# Patient Record
Sex: MALE | Race: BLACK OR AFRICAN AMERICAN | HISPANIC OR LATINO | Employment: OTHER | URBAN - METROPOLITAN AREA
[De-identification: names, ages, dates, MRNs, and addresses within clinical notes are randomized per-mention and may not be internally consistent; named-entity substitution may affect disease eponyms.]

---

## 2020-11-19 ENCOUNTER — HOSPITAL ENCOUNTER (OUTPATIENT)
Dept: URGENT CARE | Facility: CLINIC | Age: 40
Discharge: HOME | End: 2020-11-19
Attending: NURSE PRACTITIONER

## 2022-05-26 ENCOUNTER — OFFICE VISIT (OUTPATIENT)
Dept: SLEEP MEDICINE | Facility: HOSPITAL | Age: 42
End: 2022-05-26

## 2022-05-26 VITALS
WEIGHT: 195.3 LBS | HEIGHT: 74 IN | BODY MASS INDEX: 25.07 KG/M2 | HEART RATE: 82 BPM | SYSTOLIC BLOOD PRESSURE: 135 MMHG | DIASTOLIC BLOOD PRESSURE: 91 MMHG | OXYGEN SATURATION: 97 %

## 2022-05-26 DIAGNOSIS — R06.83 SNORING: ICD-10-CM

## 2022-05-26 PROCEDURE — G0463 HOSPITAL OUTPT CLINIC VISIT: HCPCS

## 2022-05-26 RX ORDER — CHOLECALCIFEROL (VITAMIN D3) 125 MCG
5 CAPSULE ORAL
COMMUNITY

## 2022-05-26 RX ORDER — IBUPROFEN 200 MG
200 TABLET ORAL EVERY 6 HOURS PRN
COMMUNITY

## 2022-05-26 NOTE — PROGRESS NOTES
"CONSULT NOTE    Patient Identification:  Claude Coffi Zohoun  41 y.o.  male  1980  [id number]            Requesting physician: Jose quesada    Reason for Consultation: Snoring hypersomnia/insomnia    CC:     History of Present Illness:  41-year-old gentleman is here for evaluation of snoring and witnessed apnea.  He also reports issues with sleep onset and maintenance.  He also reports witnessed apnea reported by his wife.  Wakes up feeling unrested sleepy and tired as the day progresses.  No heavy alcohol use was reported.  No parasomnia such as sleepwalking or sleep talking as such was reported.  Sleep schedule time to bed 10 gets up 5 gets about 6+ hours of sleep and still feels tired.  Also reports morning headache with a dry mouth.  No previous sleep study or evaluation done.      Review of Systems  Completely -12 point review of system  [place] 5 out of 24 within normal limits  Past Medical History:  No past medical history on file.  Low back pain on ibuprofen  Past Surgical History:  No past surgical history on file.     Home Meds:  (Not in a hospital admission)      Allergies:  Not on File    Social History:   Social History     Socioeconomic History   • Marital status:        Family History:  No family history on file.    Physical Exam:  /91 (BP Location: Left arm, Patient Position: Sitting)   Pulse 82   Ht 188 cm (74\")   Wt 88.6 kg (195 lb 4.8 oz)   SpO2 97%   BMI 25.08 kg/m²  Body mass index is 25.08 kg/m². 97% 88.6 kg (195 lb 4.8 oz)  Physical Exam  Patient is examined using the personal protective equipment as per guidelines from infection control for this particular patient as enacted.  Hand hygiene was performed before and after patient interaction.  Well-developed normal body habitus  Eyes normal conjunctivae and pupils reactive to light  ENT Mallampati between 3 and 4 normal nasal exam  Neck midline trachea no thyromegaly  Chest no labored breathing clear  CVS regular rate and " rhythm no lower extremity edema  Abdomen soft nontender no hepatosplenomegaly  CNS intact normal sensory exam  Skin no rashes no nodules  Psych oriented to time place and person normal memory  Musculoskeletal no cyanosis no clubbing normal range of motion        LABS:  No results found for: CALCIUM, PHOS    No results found for: CKTOTAL, CKMB, CKMBINDEX, TROPONINI, TROPONINT                              No results found for: TSH  CrCl cannot be calculated (No successful lab value found.).         Imaging: I personally visualized the images of scans/x-rays performed within last 3 days.      Assessment:  Hypersomnia  Insomnia  Snoring  Low back pain        Recommendations:  At this time we have a young gentleman with snoring witnessed apnea and some insomnia sleep onset and maintenance.  There may be element of sleep disordered breathing.  Discussed pathophysiology consequence of untreated sleep apnea.  Patient agreeable wishing to proceed for home sleep study.  Sleep hygiene measures discussed in detail.  I have asked him to use melatonin up to 10 mg if needed for sleep onset or sleep maintenance insomnia and see if that helps.  Back pain needs to be controlled  I will follow-up after sleep study to make further recommendations                Danya Roberson MD  5/26/2022  08:58 EDT      Much of this encounter note is an electronic transcription/translation of spoken language to printed text using Dragon Software.

## 2022-05-31 ENCOUNTER — OFFICE VISIT (OUTPATIENT)
Dept: UROLOGY | Facility: CLINIC | Age: 42
End: 2022-05-31

## 2022-05-31 VITALS — BODY MASS INDEX: 25.38 KG/M2 | WEIGHT: 197.8 LBS | HEIGHT: 74 IN

## 2022-05-31 DIAGNOSIS — N50.3 EPIDIDYMAL CYST: Primary | ICD-10-CM

## 2022-05-31 PROCEDURE — 99202 OFFICE O/P NEW SF 15 MIN: CPT | Performed by: UROLOGY

## 2022-05-31 RX ORDER — LOSARTAN POTASSIUM 25 MG/1
25 TABLET ORAL DAILY
COMMUNITY

## 2022-05-31 NOTE — PROGRESS NOTES
"    UROLOGY OFFICE H&P NOTE    Subjective   HPI  Claude Coffi Zohoun is a 41 y.o. male. The patient states that he has a concern about his scrotum that has been ongoing since 12/2021. He has a small lump inside his right testicle. He adds that he can feel the lump when his testicle stretches. The lump is not painful, and has not increased in size. He describes the lump as \"squishy.\" He has 2 children, and has not had a vasectomy.     He denies a family history of testicular cancer.   He does have a family history of prostate cancer in an uncle who passed away last week from prostate cancer in spite of having undergone surgery. His father is still living at 85 years old.    The patient is currently in the .  No prior imaging or urologic evaluation.      No results found for this or any previous visit.      Medical History:  Past Medical History:   Diagnosis Date   • Hypertension         Social History:  Social History     Socioeconomic History   • Marital status:    Tobacco Use   • Smoking status: Never Smoker   • Smokeless tobacco: Never Used   Vaping Use   • Vaping Use: Never used        Family History:  Family History   Problem Relation Age of Onset   • Prostate cancer Paternal Uncle         Surgical History:  History reviewed. No pertinent surgical history.     Allergies:  No Known Allergies     Current Medications:  Current Outpatient Medications   Medication Sig Dispense Refill   • ibuprofen (ADVIL,MOTRIN) 200 MG tablet Take 200 mg by mouth Every 6 (Six) Hours As Needed for Mild Pain .     • losartan (COZAAR) 25 MG tablet Take 25 mg by mouth Daily.     • melatonin 5 MG tablet tablet Take 5 mg by mouth.       No current facility-administered medications for this visit.       Review of systems  A review of systems was performed, and positive findings are noted in the HPI.    Objective     Vital Signs:   Ht 188 cm (74\")   Wt 89.7 kg (197 lb 12.8 oz)   BMI 25.40 kg/m²       Physical exam  No acute " distress, well-nourished  Awake alert and oriented  Mood normal; affect normal  : Circumcised male with no lesions; scrotum without edema or lesions; bilateral descended testis of normal size, shape, and consistency; no cord abnormality bilaterally; palpable cystic feature to right epididymis, all small, nontender; normal palpable left epididymis    Problem List:  There is no problem list on file for this patient.      Assessment & Plan   Diagnoses and all orders for this visit:    1. Epididymal cyst (Primary)  -     US Scrotum & Testicles; Future      Discussed general American urologic Association recommendations for routine prostate cancer screening given patient's family history of prostate cancer in uncle.  Discussed that routine prostate cancer screening via annual PSA is recommended to start around age 55.  No indication for prostate cancer screening at this time but would recommend annual PSA at age 55.    1. Lump in right testicle-provided reassurance per physical exam findings  The patient has a small, painless lump in his right testicle that has been present since 12/2021. This most likely represents a series of cysts of the epididymis. If that is what his lump is, we will not need to do anything further unless the lump increases in size or begins to cause him pain.     We will order a testicular ultrasound.     Patient encouraged to follow-up 1 month, with prior testicular ultrasound.  All questions addressed    Transcribed from ambient dictation for Shoshana Oconnor MD by Carol Whitaker.  05/31/22   15:36 EDT    Patient verbalized consent to the visit recording.

## 2022-06-01 ENCOUNTER — PATIENT ROUNDING (BHMG ONLY) (OUTPATIENT)
Dept: UROLOGY | Facility: CLINIC | Age: 42
End: 2022-06-01

## 2022-06-01 NOTE — PROGRESS NOTES
June 1, 2022    Hello, may I speak with Claude Coffi Zohoun?    My name is CONG Holden      I am  with [facility]  [address]  [place] [address] [phone].    Before we get started may I verify your date of birth? 1980    I am calling to officially welcome you to our practice and ask about your recent visit. Is this a good time to talk? yes    Tell me about your visit with us. What things went well?  Patient stated that his visit went good. Dr. Oconnor was very thorough and explained things very well to him. He appreciated that.        We're always looking for ways to make our patients' experiences even better. Do you have recommendations on ways we may improve?  no    Overall were you satisfied with your first visit to our practice? yes       I appreciate you taking the time to speak with me today. Is there anything else I can do for you? no      Thank you, and have a great day.

## 2022-06-08 ENCOUNTER — HOSPITAL ENCOUNTER (OUTPATIENT)
Dept: SLEEP MEDICINE | Facility: HOSPITAL | Age: 42
Discharge: HOME | End: 2022-06-08
Admitting: INTERNAL MEDICINE

## 2022-06-08 DIAGNOSIS — R06.83 SNORING: ICD-10-CM

## 2022-06-08 PROCEDURE — 95806 SLEEP STUDY UNATT&RESP EFFT: CPT

## 2022-07-07 ENCOUNTER — TELEPHONE (OUTPATIENT)
Dept: UROLOGY | Facility: CLINIC | Age: 42
End: 2022-07-07

## 2022-07-07 NOTE — TELEPHONE ENCOUNTER
PER KASIA @ [facility], STILL DOESN'T HAVE AUTH FOR THE ULTRASOUND. SHE SAID IT WILL BE 3-5 MORE BUSINESS DAYS. I TOLD HER THAT THEY COULD RESCHEDULE HIM OUT A FEW WEEKS FOR THIS. I WILL HAVE OUR SCHEDULING CALL HIM TO RESCHEDULE HIS IN OFFICE FOLLOW-UP ALSO.

## 2022-07-08 ENCOUNTER — APPOINTMENT (OUTPATIENT)
Dept: ULTRASOUND IMAGING | Facility: HOSPITAL | Age: 42
End: 2022-07-08

## 2022-07-13 ENCOUNTER — TELEPHONE (OUTPATIENT)
Dept: UROLOGY | Facility: CLINIC | Age: 42
End: 2022-07-13

## 2022-07-13 NOTE — TELEPHONE ENCOUNTER
SPOKE TO PATIENT AND LET HIM KNOW THAT, PER [facility], HIS INSURANCE APPROVED HIS ULTRASOUND. HE IS GOOD TO GO FOR HIS IMAGING APPT ON 7/22. PATIENT EXPRESSED UNDERSTANDING. ADVISED HIM THAT SCHEDULING WOULD BE CALLING HIM TO ARRANGE AN IN OFFICE FOLLOW UP. PATIENT IS AGREEABLE.

## 2022-07-13 NOTE — TELEPHONE ENCOUNTER
SPOKE TO PT AND HE STATED THAT HE'S HAVING ISSUES WITH THE INSURANCE PAYING FOR THE US HE NEEDS PRIOR/HE IS SCHEDULED FOR 7/22 FOR US BUT NOT SURE IF IT WILL BE CX/HE WILL CALL BACK TO F/U WITH [facility] AFTER US IS COMPLETE/ANYTHING ELSE TO DO?

## 2022-07-22 ENCOUNTER — HOSPITAL ENCOUNTER (OUTPATIENT)
Dept: ULTRASOUND IMAGING | Facility: HOSPITAL | Age: 42
Discharge: HOME | End: 2022-07-22
Admitting: UROLOGY

## 2022-07-22 DIAGNOSIS — N50.3 EPIDIDYMAL CYST: ICD-10-CM

## 2022-07-22 PROCEDURE — 76870 US EXAM SCROTUM: CPT

## 2022-08-30 PROBLEM — N50.3 EPIDIDYMAL CYST: Status: ACTIVE | Noted: 2022-08-30

## 2022-08-30 PROBLEM — R06.83 SNORING: Status: ACTIVE | Noted: 2022-08-30

## 2022-09-01 ENCOUNTER — OFFICE VISIT (OUTPATIENT)
Dept: UROLOGY | Facility: CLINIC | Age: 42
End: 2022-09-01

## 2022-09-01 VITALS — HEIGHT: 74 IN | RESPIRATION RATE: 16 BRPM | WEIGHT: 197 LBS | BODY MASS INDEX: 25.28 KG/M2

## 2022-09-01 DIAGNOSIS — I86.1: Primary | ICD-10-CM

## 2022-09-01 PROCEDURE — 99212 OFFICE O/P EST SF 10 MIN: CPT | Performed by: UROLOGY

## 2022-09-01 RX ORDER — LOSARTAN POTASSIUM 25 MG/1
1 TABLET ORAL DAILY
COMMUNITY
Start: 2022-05-19 | End: 2023-05-19

## 2022-09-01 NOTE — PROGRESS NOTES
"    UROLOGY OFFICE follow-up NOTE    Subjective   HPI  Claude Coffi Zohoun is a 42 y.o. male. The patient states that he has a concern about his scrotum that has been ongoing since 12/2021. He has a small lump inside his right testicle. He adds that he can feel the lump when his testicle stretches. The lump is not painful, and has not increased in size. He describes the lump as \"squishy.\" He has 2 children, and has not had a vasectomy.      He denies a family history of testicular cancer.   He does have a family history of prostate cancer in an uncle who passed away last week from prostate cancer in spite of having undergone surgery. His father is still living at 85 years old.     The patient is currently in the .  No prior imaging or urologic evaluation.      Update 9/1/2022: Patient presents for follow-up of scrotal concern, epididymal cyst with scrotal ultrasound prior.    He denies any pain or concerns. The patient does have children and does not plan to have more children.      ________________  Ultrasound scrotum and testicles, 7/22/2022: No evidence of mass.  Small tiny fluid collection surrounding right and left testicle; head of the epididymis measures 9 mm; bilateral varicoceles      Review of systems  A review of systems was performed, and positive findings are noted in the HPI.    Objective     Vital Signs:   Resp 16   Ht 188 cm (74.02\")   Wt 89.4 kg (197 lb)   BMI 25.28 kg/m²       Physical exam  No acute distress, well-nourished  Awake alert and oriented  Mood normal; affect normal  : Circumcised male with no lesions; scrotum without edema or lesions; bilateral descended testis of normal size, shape, and consistency; no cord abnormality bilaterally while supine; while standing and upon Valsalva only left varicocele palpable; prominent feature to right epididymis consistent with right epididymal head noted on scrotal ultrasound, nontender; normal palpable left " epididymis    Results  PROCEDURE:  US SCROTUM AND TESTICLES     COMPARISON: None     INDICATIONS:  right testicular lump; suspected epididymal cyst     TECHNIQUE:    The scrotum and testicles were evaluated with gray scale and color duplex Doppler   sonography.       FINDINGS:          The right testicle measures 3.2 cm by I 4.5 cm x 1.9 cm in size.  No evidence of mass in the right   testicle.  Normal arterial blood flow is seen in the right testicle.  The head of the right   epididymis is.  There is a small tiny fluid collection surrounding the right testicle.  The veins   are prominent tortuous and dilated adjacent to the right testicle consistent varicocele.  The head   of the right epididymis measures 9 mm     Left testicle measures 3 cm by 4.8 cm x 1.9 cm in size.  No evidence of mass in the left testicle.    There is normal arterial blood flow in left testicle.  The head of the left testicle measures 1.1   cm in size.  The veins are tortuous and dilated adjacent to the left testicle consistent with   varicocele.  There is a small tiny amount of fluid surrounding the left testicle.     IMPRESSION:                 1. Bilateral varicoceles adjacent to the right left testicles.  2. Small benign amount of fluid surrounding the right left testicles is symmetric.  3. Right left testicles are            CHAYO BAUTISTA MD         Electronically Signed and Approved By: CHAYO BAUTISTA MD on 7/22/2022 at 15:51          Problem List:  Patient Active Problem List   Diagnosis   • Epididymal cyst   • Snoring         Assessment & Plan   Diagnoses and all orders for this visit:    1. Varicocele palpable with Valsalva maneuver only (Primary)      Scrotal ultrasound imaging reviewed, demonstrated patient.  Physical exam findings discussed with patient; area of palpable concern to patient consistent with prominent right epididymal head.  Discussed that this is a normal variant of no concern and nothing to do.    The  patient has right varicocele only U/S which is considered clinically insignificant at this point.  Discussed that should he develop visible or palpable varicocele on that side, further work-up may need to be considered.     Left varicocele only upon Valsalva-discussed at length that varicocele is a group of dilated veins in the pampiniform plexus that may be associated with inguinal, scrotal, or testicular pain.  Patient not experiencing pain.  Discussed that varicocele is the most common finding in sub-fertile men however not all varicoceles cause infertility.  Patient not interested in further infertility.  Discussed that treatment requires surgical intervention.    Given the above recommend proceeding conservatively at this time and he is encouraged to schedule follow appointment if needed  Follow up PRN.   All questions addressed          Transcribed from ambient dictation for Shoshana Oconnor MD by Gabriel Lucia.  09/01/22   16:43 EDT    Patient verbalized consent to the visit recording.  I have personally performed the services described in this document as transcribed by the above individual, and it is both accurate and complete.  Shoshana Oconnor MD  9/2/2022  12:01 EDT

## 2022-10-24 ENCOUNTER — OFFICE VISIT (OUTPATIENT)
Dept: ORTHOPEDIC SURGERY | Facility: CLINIC | Age: 42
End: 2022-10-24

## 2022-10-24 VITALS — WEIGHT: 197 LBS | BODY MASS INDEX: 25.28 KG/M2 | HEIGHT: 74 IN

## 2022-10-24 DIAGNOSIS — M25.562 ACUTE PAIN OF LEFT KNEE: Primary | ICD-10-CM

## 2022-10-24 DIAGNOSIS — R22.32 MASS OF LEFT WRIST: ICD-10-CM

## 2022-10-24 PROCEDURE — 99203 OFFICE O/P NEW LOW 30 MIN: CPT | Performed by: ORTHOPAEDIC SURGERY

## 2022-10-24 NOTE — PROGRESS NOTES
"Chief Complaint  Initial Evaluation of the Left Knee and Initial Evaluation of the Left Wrist     Subjective      Claude Coffi Zohoun presents to [facility] for an evaluation of left wrist and left knee. Patient has pain that comes and goes in the knee. He points to his VMO as his main location of pain. He is present today with MRI results.     Patient also noticed a mass forming on the left wrist. He denies any injury or trauma. Denies any numbness.       No Known Allergies     Social History     Socioeconomic History   • Marital status:    Tobacco Use   • Smoking status: Never   • Smokeless tobacco: Never   Vaping Use   • Vaping Use: Never used        Review of Systems     Objective   Vital Signs:   Ht 188 cm (74\")   Wt 89.4 kg (197 lb)   BMI 25.29 kg/m²       Physical Exam  Constitutional:       Appearance: Normal appearance. Patient is well-developed and normal weight.   HENT:      Head: Normocephalic.      Right Ear: Hearing and external ear normal.      Left Ear: Hearing and external ear normal.      Nose: Nose normal.   Eyes:      Conjunctiva/sclera: Conjunctivae normal.   Cardiovascular:      Rate and Rhythm: Normal rate.   Pulmonary:      Effort: Pulmonary effort is normal.      Breath sounds: No wheezing or rales.   Abdominal:      Palpations: Abdomen is soft.      Tenderness: There is no abdominal tenderness.   Musculoskeletal:      Cervical back: Normal range of motion.   Skin:     Findings: No rash.   Neurological:      Mental Status: Patient  is alert and oriented to person, place, and time.   Psychiatric:         Mood and Affect: Mood and affect normal.         Judgment: Judgment normal.       Ortho Exam      LEFT WRIST: Mass of the wrist, consistent with a ganglion. Full wrist extension, full wrist flexion, full , full thumb opposition. Full PIP flexors, full DIP flexors, full PIP extensors. No swelling, skin discoloration or atrophy. Negative tinel's. "       LEFT KNEE: Good strength to hamstrings, quadriceps, dorsiflexors and plantar flexors. Stable to varus/valgus stress. Stable anterior and posterior drawer. Negative Lachman. Dorsal Pedal Pulse 2+, posterior tibialis pulse 2+. Calf soft. No swelling, skin discoloration or atrophy. Full extension. Flexion to 125 degrees.     Procedures        Imaging Results (Most Recent)     None           Result Review :       [facility]     10/3/22     MRI LEFT KNEE     IMPRESSION: 1. Minimal degeneration of the free margin of the body segment lateral menisus without evidence of a discrete tear. Medial meniscus is also intact.      2. No cruciate or collateral ligament tears.      3. No osseous or cartilaginous abnormality.      Assessment and Plan     Diagnoses and all orders for this visit:    1. Acute pain of left knee (Primary)    2. Mass of left wrist        Plan to continue monitoring the mass and the knee pain.     May call back for PT.     Call or return if worsening symptoms.    Follow Up     Prn      Patient was given instructions and counseling regarding his condition or for health maintenance advice. Please see specific information pulled into the AVS if appropriate.     Scribed for Blue Borjas MD by Kylie King.  10/24/22   14:41 EDT    I have personally performed the services described in this document as scribed by the above individual and it is both accurate and complete. Blue Borjas MD 10/24/22

## 2022-12-05 ENCOUNTER — TRANSCRIBE ORDERS (OUTPATIENT)
Dept: ADMINISTRATIVE | Facility: HOSPITAL | Age: 42
End: 2022-12-05

## 2022-12-05 DIAGNOSIS — R10.32 LEFT LOWER QUADRANT ABDOMINAL PAIN: Primary | ICD-10-CM

## 2022-12-08 ENCOUNTER — APPOINTMENT (OUTPATIENT)
Dept: CT IMAGING | Facility: HOSPITAL | Age: 42
End: 2022-12-08

## 2022-12-30 ENCOUNTER — HOSPITAL ENCOUNTER (OUTPATIENT)
Dept: CT IMAGING | Facility: HOSPITAL | Age: 42
Discharge: HOME | End: 2022-12-30
Admitting: STUDENT IN AN ORGANIZED HEALTH CARE EDUCATION/TRAINING PROGRAM

## 2022-12-30 DIAGNOSIS — R10.32 LEFT LOWER QUADRANT ABDOMINAL PAIN: ICD-10-CM

## 2022-12-30 PROCEDURE — 74177 CT ABD & PELVIS W/CONTRAST: CPT

## 2022-12-30 PROCEDURE — 0 IOPAMIDOL PER 1 ML: Performed by: STUDENT IN AN ORGANIZED HEALTH CARE EDUCATION/TRAINING PROGRAM

## 2022-12-30 RX ADMIN — IOPAMIDOL 100 ML: 755 INJECTION, SOLUTION INTRAVENOUS at 16:26

## 2023-09-01 ENCOUNTER — HOSPITAL ENCOUNTER (EMERGENCY)
Facility: HOSPITAL | Age: 43
Discharge: HOME | End: 2023-09-01
Attending: EMERGENCY MEDICINE
Payer: OTHER GOVERNMENT

## 2023-09-01 ENCOUNTER — APPOINTMENT (OUTPATIENT)
Dept: CT IMAGING | Facility: HOSPITAL | Age: 43
End: 2023-09-01
Payer: OTHER GOVERNMENT

## 2023-09-01 VITALS
HEART RATE: 71 BPM | DIASTOLIC BLOOD PRESSURE: 94 MMHG | HEIGHT: 74 IN | RESPIRATION RATE: 16 BRPM | BODY MASS INDEX: 26.09 KG/M2 | WEIGHT: 203.26 LBS | SYSTOLIC BLOOD PRESSURE: 135 MMHG | TEMPERATURE: 98.2 F | OXYGEN SATURATION: 100 %

## 2023-09-01 DIAGNOSIS — M54.2 NECK PAIN: Primary | ICD-10-CM
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
## 2023-09-01 DIAGNOSIS — S16.1XXA STRAIN OF NECK MUSCLE, INITIAL ENCOUNTER: ICD-10-CM

## 2023-09-01 PROCEDURE — 25010000002 KETOROLAC TROMETHAMINE PER 15 MG: Performed by: PHYSICIAN ASSISTANT

## 2023-09-01 PROCEDURE — 72125 CT NECK SPINE W/O DYE: CPT

## 2023-09-01 PROCEDURE — 99284 EMERGENCY DEPT VISIT MOD MDM: CPT

## 2023-09-01 PROCEDURE — 96372 THER/PROPH/DIAG INJ SC/IM: CPT

## 2023-09-01 PROCEDURE — 97140 MANUAL THERAPY 1/> REGIONS: CPT | Performed by: PHYSICAL THERAPIST

## 2023-09-01 PROCEDURE — 97161 PT EVAL LOW COMPLEX 20 MIN: CPT | Performed by: PHYSICAL THERAPIST

## 2023-09-01 PROCEDURE — 97110 THERAPEUTIC EXERCISES: CPT | Performed by: PHYSICAL THERAPIST

## 2023-09-01 PROCEDURE — 70450 CT HEAD/BRAIN W/O DYE: CPT

## 2023-09-01 RX ORDER — KETOROLAC TROMETHAMINE 30 MG/ML
30 INJECTION, SOLUTION INTRAMUSCULAR; INTRAVENOUS ONCE
Status: COMPLETED | OUTPATIENT
Start: 2023-09-01 | End: 2023-09-01

## 2023-09-01 RX ORDER — KETOROLAC TROMETHAMINE 10 MG/1
10 TABLET, FILM COATED ORAL EVERY 8 HOURS PRN
Qty: 9 TABLET | Refills: 0 | Status: SHIPPED | OUTPATIENT
Start: 2023-09-01 | End: 2023-09-04

## 2023-09-01 RX ORDER — CYCLOBENZAPRINE HCL 10 MG
10 TABLET ORAL 3 TIMES DAILY PRN
COMMUNITY

## 2023-09-01 RX ADMIN — KETOROLAC TROMETHAMINE 30 MG: 30 INJECTION, SOLUTION INTRAMUSCULAR; INTRAVENOUS at 14:59

## 2023-09-01 NOTE — Clinical Note
[facility] EMERGENCY ROOM  [address]  [place] [address]  Phone: [phone]    Claude Zohoun was seen and treated in our emergency department on 9/1/2023.  He may return to work on 09/04/2023.         Thank you for choosing [facility].    Polina Cruz PA-C
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
## 2023-09-12 ENCOUNTER — TELEPHONE (OUTPATIENT)
Dept: SLEEP MEDICINE | Facility: HOSPITAL | Age: 43
End: 2023-09-12
Payer: OTHER GOVERNMENT